# Patient Record
Sex: FEMALE | Race: BLACK OR AFRICAN AMERICAN | NOT HISPANIC OR LATINO | Employment: FULL TIME | URBAN - METROPOLITAN AREA
[De-identification: names, ages, dates, MRNs, and addresses within clinical notes are randomized per-mention and may not be internally consistent; named-entity substitution may affect disease eponyms.]

---

## 2024-03-08 ENCOUNTER — HOSPITAL ENCOUNTER (OUTPATIENT)
Dept: RADIOLOGY | Facility: CLINIC | Age: 59
Discharge: HOME | End: 2024-03-08
Payer: COMMERCIAL

## 2024-03-08 DIAGNOSIS — S49.91XA RIGHT SHOULDER INJURY, INITIAL ENCOUNTER: ICD-10-CM

## 2024-03-08 PROCEDURE — 73030 X-RAY EXAM OF SHOULDER: CPT | Mod: RIGHT SIDE | Performed by: RADIOLOGY

## 2024-03-08 PROCEDURE — 73030 X-RAY EXAM OF SHOULDER: CPT | Mod: RT

## 2024-03-18 ENCOUNTER — TELEPHONE (OUTPATIENT)
Dept: ORTHOPEDIC SURGERY | Facility: HOSPITAL | Age: 59
End: 2024-03-18
Payer: COMMERCIAL

## 2024-03-18 NOTE — TELEPHONE ENCOUNTER
Copied from CRM #[id number]. Topic: Needs Earlier Appointment  >> Mar 18, 2024  8:11 AM Marion WEST wrote:  Hi I have a pt on the line stating she needs to get seen sooner by  she has already been scheudled for the next available

## 2024-04-05 ENCOUNTER — APPOINTMENT (OUTPATIENT)
Dept: ORTHOPEDIC SURGERY | Facility: HOSPITAL | Age: 59
End: 2024-04-05
Payer: COMMERCIAL

## 2024-04-07 NOTE — PROGRESS NOTES
Subjective    Patient ID: Ita Javierx is a 58 y.o. female.    Chief Complaint: No chief complaint on file.     Last Surgery: No surgery found  Last Surgery Date: No surgery found    HPI  Ita is a 58 y.o. right hand dominant female presenting today for evaluation of their right shoulder.     She is currently . She explains that she fell off the bus a month ago and landed on her shoulder. She went to urgent care, then the ortho injury clinic who suggested that she follow up with us. She is sure she did not dislocate it as she just felt pain and no pop. She wore her sling for a week and her pain resolved mostly. She does not feel stiff or unstable. She does have pain with overhead activities, and with lowering her arm. It does not wake her up at night. She occasionally takes takes naproxen as needed for pain. She has had no therapy or injections.     Past medical history, surgical history, social history, and family history were all reviewed and are as per the [facility] patient health history questionnaire form that I signed and scanned into the chart today.     Objective   Ortho Exam  Patient is a well-developed, well-nourished female in no acute distress.  Breathes with normal chest rises.  Pupils are round and symmetric today.  Awake, alert, and oriented x3.      Examination of the left shoulder today reveals the skin to be intact. There is no sign of any atrophy, lesions, or abrasions. There is no pain to palpation of the bony prominences. Cervical lymphadenopathy examined, and this was negative. Patient had 5 out of 5 wrist flexion, extension, and thumb extension bilaterally. Sensation was intact to light touch to median, ulnar, radial axillary, and musculocutaneous nerves bilaterally. Positive radial pulse bilaterally. Provocative maneuvers on the left side today were negative.  Range of motion of the left shoulder revealed 0-170° of forward elevation, 0-60° of external rotation, and internal  rotation was to T-12.     Examination of the right shoulder today reveals the skin to be intact. There is no sign of any atrophy, lesions, or abrasions. There is no pain to palpation of the bony prominences. Cervical lymphadenopathy examined, and this was negative. Patient had 5 out of 5 wrist flexion, extension, and thumb extension, bilaterally. Sensation was intact to light touch to median, ulnar, radial, axillary, and musculocutaneous nerves bilaterally. Positive radial pulse bilaterally. Positive Hawkin's, positive Speed's, positive Jerk test.  Range of motion of the right shoulder revealed 0-160° of forward elevation, 0-40° of external rotation with pain, and internal rotation up to her mid thoracic spine.      Image Results:  XR shoulder right 2+ views  Narrative: Interpreted By:  Pietro Blankenship,   STUDY:  XR SHOULDER RIGHT 2+ VIEWS; ;  3/8/2024 10:36 am      INDICATION:  Signs/Symptoms:right shoulder injury .      COMPARISON:  None.      ACCESSION NUMBER(S):  [id number]      ORDERING CLINICIAN:  STEFAN RESENDIZ      FINDINGS:  Three-view right shoulder: There are small ossific fragments along  the inferior posterior aspect of the glenoid. Questionable mild  Hill-Sachs deformity, suboptimally assessed. No evidence of acute  dislocation. Mild osteoarthrosis of the acromioclavicular joint.  Visualized portions of the lungs are clear. Soft tissues are  unremarkable.      Impression: Small ossific fragments along the inferior and posterior aspect of  the glenoid could reflect chronicity indeterminate osseous Bankart  lesion with questionable mild Hill-Sachs deformity.      No additional evidence of acute fracture or dislocation.      MACRO:  None      Signed by: Pietro Blankenship 3/8/2024 10:55 AM  Dictation workstation:   [id number]    Imagin24 X-rays personally ordered and interpreted by me show degenerative changes of AC joint, likely non symptomatic. Inferior fragments off the  glenoid    Assessment/Plan   Encounter Diagnoses:  No diagnosis found.  Patient with right shoulder injury with a workers compensation injury after a fall at work    We will order an MRI to evaluate the rotator cuff. We know from literature that early detection of acute rotator cuff tears leads to improved outcomes in healing and functionality. They have weakness that is increased after the injury and have not regained their range of motion. As a result, we will order a STAT MRI and will discuss the results over the phone.       We had a long discussion regarding her diagnosis. We talked about her treatment options. It is my opinion that this is a direct result of her injury that she obtained on the job. She has had continued limitations of her arm a month after her injury. She may return to activities as tolerated while we evaluate her and wait for her MRI. She cannot damage this further. She was provided a note to return to work with no repetitive overhead activities.     No orders of the defined types were placed in this encounter.    Follow up once MRI is completed.     Scribe Attestation  By signing my name below, IMarva Scribe   attest that this documentation has been prepared under the direction and in the presence of Pietro Galvez MD.

## 2024-04-09 ENCOUNTER — OFFICE VISIT (OUTPATIENT)
Dept: ORTHOPEDIC SURGERY | Facility: HOSPITAL | Age: 59
End: 2024-04-09
Payer: COMMERCIAL

## 2024-04-09 VITALS — BODY MASS INDEX: 33.04 KG/M2 | HEIGHT: 61 IN | WEIGHT: 175 LBS

## 2024-04-09 DIAGNOSIS — M75.101 TEAR OF RIGHT ROTATOR CUFF, UNSPECIFIED TEAR EXTENT, UNSPECIFIED WHETHER TRAUMATIC: ICD-10-CM

## 2024-04-09 PROCEDURE — 99213 OFFICE O/P EST LOW 20 MIN: CPT | Performed by: ORTHOPAEDIC SURGERY

## 2024-04-09 RX ORDER — CYCLOBENZAPRINE HCL 10 MG
10 TABLET ORAL
COMMUNITY
Start: 2018-09-10

## 2024-04-09 ASSESSMENT — PAIN SCALES - GENERAL: PAINLEVEL_OUTOF10: 2

## 2024-04-09 ASSESSMENT — PAIN - FUNCTIONAL ASSESSMENT: PAIN_FUNCTIONAL_ASSESSMENT: 0-10

## 2024-04-09 NOTE — LETTER
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    If you have any questions or concerns, please don't hesitate to call.         Sincerely,        Pietro Galvez MD    CC: No Recipients

## 2024-04-29 ENCOUNTER — HOSPITAL ENCOUNTER (OUTPATIENT)
Dept: RADIOLOGY | Facility: HOSPITAL | Age: 59
Discharge: HOME | End: 2024-04-29
Payer: COMMERCIAL

## 2024-04-29 DIAGNOSIS — M75.101 TEAR OF RIGHT ROTATOR CUFF, UNSPECIFIED TEAR EXTENT, UNSPECIFIED WHETHER TRAUMATIC: ICD-10-CM

## 2024-04-29 PROCEDURE — 73221 MRI JOINT UPR EXTREM W/O DYE: CPT | Mod: RIGHT SIDE | Performed by: STUDENT IN AN ORGANIZED HEALTH CARE EDUCATION/TRAINING PROGRAM

## 2024-04-29 PROCEDURE — 73221 MRI JOINT UPR EXTREM W/O DYE: CPT | Mod: RT

## 2024-05-11 NOTE — PROGRESS NOTES
Subjective    Patient ID: Ita Cervantes is a 59 y.o. female.    Chief Complaint: No chief complaint on file.     Last Surgery: No surgery found  Last Surgery Date: No surgery found    HPI  Ita is a 59 y.o. right hand dominant female presenting today for evaluation of their right shoulder.     She is currently . She explains that she fell off the bus a month ago and landed on her shoulder. She went to urgent care, then the ortho injury clinic who suggested that she follow up with us. She is sure she did not dislocate it as she just felt pain and no pop. She wore her sling for a week and her pain resolved mostly. She does not feel stiff or unstable. She does have pain with overhead activities, and with lowering her arm. It does not wake her up at night. She occasionally takes takes naproxen as needed for pain. She has had no therapy or injections.     05/14/24  Ita returns to the clinic today for a follow up visit regarding her right shoulder.     She is here today to follow up on her recent MRI results.     Past medical history, surgical history, social history, and family history were all reviewed and are as per the [facility] patient health history questionnaire form that I signed and scanned into the chart today.     Objective   Ortho Exam  Patient is a well-developed, well-nourished female in no acute distress.  Breathes with normal chest rises.  Pupils are round and symmetric today.  Awake, alert, and oriented x3.      Examination of the left shoulder today reveals the skin to be intact. There is no sign of any atrophy, lesions, or abrasions. There is no pain to palpation of the bony prominences. Cervical lymphadenopathy examined, and this was negative. Patient had 5 out of 5 wrist flexion, extension, and thumb extension bilaterally. Sensation was intact to light touch to median, ulnar, radial axillary, and musculocutaneous nerves bilaterally. Positive radial pulse bilaterally. Provocative  maneuvers on the left side today were negative.  Range of motion of the left shoulder revealed 0-170° of forward elevation, 0-60° of external rotation, and internal rotation was to T-12.     Examination of the right shoulder today reveals the skin to be intact. There is no sign of any atrophy, lesions, or abrasions. There is no pain to palpation of the bony prominences. Cervical lymphadenopathy examined, and this was negative. Patient had 5 out of 5 wrist flexion, extension, and thumb extension, bilaterally. Sensation was intact to light touch to median, ulnar, radial, axillary, and musculocutaneous nerves bilaterally. Positive radial pulse bilaterally. Positive Hawkin's, positive Speed's, positive Jerk test.  Range of motion of the right shoulder revealed 0-160° of forward elevation, 0-40° of external rotation with pain, and internal rotation up to her mid thoracic spine. 5/5 Willam's testing    Image Results:  MR shoulder right wo IV contrast  Narrative: Interpreted By:  Richard Ku and Herzog Jackson   STUDY:  MRI of the  right shoulder without IV contrast;  4/29/2024 2:54 pm      INDICATION:  Right shoulder pain.      COMPARISON:  Right shoulder radiographs 324.      ACCESSION NUMBER(S):  [id number]      ORDERING CLINICIAN:  CHRIS GARDNER      TECHNIQUE:  MR imaging of the  right shoulder was obtained  without IV contrast.      FINDINGS:  ROTATOR CUFF TENDONS:  Mild supraspinatus, infraspinatus, and subscapularis tendinosis  without discrete tear. The teres minor tendon is intact. There is no  edema or fatty atrophy of the rotator cuff musculature.      BICEPS TENDON AND ROTATOR INTERVAL:  Mild tendinosis of the intra-articular long head biceps tendon with  increased intrasubstance signal.      OSSEOUS STRUCTURES/JOINTS:  Minimally displaced fracture of the inferior portion of the glenoid  with small amount of surrounding marrow edema.      Moderate acromioclavicular osteoarthrosis with inferiorly  projecting  osteophytes demonstrating a degree of mass effect on the  supraspinatus myotendinous junction.      Small volume joint effusion. Small volume of fluid within the  subacromial subdeltoid bursa.      LABRUM:  The minimally displaced fracture of the inferior glenoid involves the  inferior labrum. There is truncation of the posteroinferior and  posterosuperior labrum consistent with tear. Additional anterior  inferior chondrolabral junction linear signal abnormality.      SOFT TISSUES:  The suprascapular nerve is intact at the suprascapular and  spinoglenoid notches.      Impression: 1.  Minimally displaced fracture of the inferior glenoid rim which  involves the inferior labrum.  2. Mild tendinosis of the supraspinatus, infraspinatus, and  subscapularis tendons without discrete tear.  3. Mild subacromial subdeltoid bursitis.  4. Mild intra-articular long head biceps tendinosis.      I personally reviewed the images/study and I agree with the findings  as stated. This study was interpreted at [place], [place].      MACRO:  None      Signed by: Richard Ku 4/29/2024 3:14 PM  Dictation workstation:   [id number]    Imaging:  MRI personally reviewed of the right shoulder today reveals thinning of the rotator cuff. Significant fluid around the bicep tendon. Anterior and posterior rotator cuff intact. Some changes along the subscapularis consistent with rotator cuff disease.  No sign of full thickness tearing. Evidence of a fracture line in the glenoid    04/09/24 X-rays personally ordered and interpreted by me show degenerative changes of AC joint, likely non symptomatic. Inferior fragments off the glenoid    Assessment/Plan   Encounter Diagnoses:  No diagnosis found.  Patient with right shoulder injury with a workers compensation injury after a fall at work    It is my medical opinion that she does not need further surgery. She may need more treatment in the  future including therapy or injections. She may return to work without restrictions. I want her to do her at-home exercises daily, focusing on scapular stabilizers. She is to return if she continues to have pain and shoulder dysfunction.    No orders of the defined types were placed in this encounter.    Follow up as needed     Scribe Attestation  By signing my name below, I, Marva Dominguez , Scribe   attest that this documentation has been prepared under the direction and in the presence of Pietro Galvez MD.

## 2024-05-14 ENCOUNTER — OFFICE VISIT (OUTPATIENT)
Dept: ORTHOPEDIC SURGERY | Facility: HOSPITAL | Age: 59
End: 2024-05-14
Payer: COMMERCIAL

## 2024-05-14 DIAGNOSIS — M75.101 TEAR OF RIGHT ROTATOR CUFF, UNSPECIFIED TEAR EXTENT, UNSPECIFIED WHETHER TRAUMATIC: Primary | ICD-10-CM

## 2024-05-14 PROCEDURE — 99214 OFFICE O/P EST MOD 30 MIN: CPT | Performed by: ORTHOPAEDIC SURGERY

## 2024-05-14 ASSESSMENT — PAIN - FUNCTIONAL ASSESSMENT: PAIN_FUNCTIONAL_ASSESSMENT: NO/DENIES PAIN

## 2024-05-14 NOTE — LETTER
May 14, 2024     Patient: Ita Cervantes   YOB: 1965   Date of Visit: 5/14/2024       To Whom It May Concern:    It is my medical opinion that Ita Cervantes may return to full duty immediately with no restrictions.    If you have any questions or concerns, please don't hesitate to call.         Sincerely,        Pietro Galvez MD

## 2025-09-02 ENCOUNTER — APPOINTMENT (OUTPATIENT)
Dept: URGENT CARE | Age: 60
End: 2025-09-02
Payer: COMMERCIAL